# Patient Record
Sex: MALE | Race: WHITE | NOT HISPANIC OR LATINO | Employment: OTHER | URBAN - METROPOLITAN AREA
[De-identification: names, ages, dates, MRNs, and addresses within clinical notes are randomized per-mention and may not be internally consistent; named-entity substitution may affect disease eponyms.]

---

## 2022-04-07 ENCOUNTER — HISTORICAL (OUTPATIENT)
Dept: ADMINISTRATIVE | Facility: HOSPITAL | Age: 37
End: 2022-04-07

## 2022-04-24 VITALS
HEIGHT: 71 IN | OXYGEN SATURATION: 97 % | BODY MASS INDEX: 36.98 KG/M2 | WEIGHT: 264.13 LBS | SYSTOLIC BLOOD PRESSURE: 108 MMHG | DIASTOLIC BLOOD PRESSURE: 70 MMHG

## 2023-02-20 ENCOUNTER — HOSPITAL ENCOUNTER (EMERGENCY)
Facility: HOSPITAL | Age: 38
Discharge: HOME | End: 2023-02-20
Attending: EMERGENCY MEDICINE
Payer: COMMERCIAL

## 2023-02-20 VITALS
WEIGHT: 260 LBS | HEIGHT: 71 IN | RESPIRATION RATE: 16 BRPM | TEMPERATURE: 98 F | SYSTOLIC BLOOD PRESSURE: 116 MMHG | HEART RATE: 81 BPM | BODY MASS INDEX: 36.4 KG/M2 | DIASTOLIC BLOOD PRESSURE: 69 MMHG | OXYGEN SATURATION: 98 %

## 2023-02-20 DIAGNOSIS — S61.411A LACERATION OF RIGHT HAND WITHOUT FOREIGN BODY, INITIAL ENCOUNTER: Primary | ICD-10-CM

## 2023-02-20 PROCEDURE — 90715 TDAP VACCINE 7 YRS/> IM: CPT | Performed by: NURSE PRACTITIONER

## 2023-02-20 PROCEDURE — 99284 EMERGENCY DEPT VISIT MOD MDM: CPT | Mod: 25

## 2023-02-20 PROCEDURE — 90471 IMMUNIZATION ADMIN: CPT | Performed by: NURSE PRACTITIONER

## 2023-02-20 PROCEDURE — 25000003 PHARM REV CODE 250: Performed by: NURSE PRACTITIONER

## 2023-02-20 PROCEDURE — 63600175 PHARM REV CODE 636 W HCPCS: Performed by: NURSE PRACTITIONER

## 2023-02-20 PROCEDURE — 12042 INTMD RPR N-HF/GENIT2.6-7.5: CPT

## 2023-02-20 PROCEDURE — 12002 RPR S/N/AX/GEN/TRNK2.6-7.5CM: CPT

## 2023-02-20 RX ORDER — CEPHALEXIN 500 MG/1
500 CAPSULE ORAL 4 TIMES DAILY
Qty: 28 CAPSULE | Refills: 0 | Status: SHIPPED | OUTPATIENT
Start: 2023-02-20 | End: 2023-02-27

## 2023-02-20 RX ORDER — HYDROCODONE BITARTRATE AND ACETAMINOPHEN 7.5; 325 MG/1; MG/1
1 TABLET ORAL
Status: COMPLETED | OUTPATIENT
Start: 2023-02-20 | End: 2023-02-20

## 2023-02-20 RX ORDER — HYDROCODONE BITARTRATE AND ACETAMINOPHEN 7.5; 325 MG/1; MG/1
1 TABLET ORAL EVERY 6 HOURS PRN
Qty: 16 TABLET | Refills: 0 | Status: SHIPPED | OUTPATIENT
Start: 2023-02-20 | End: 2023-03-22

## 2023-02-20 RX ADMIN — HYDROCODONE BITARTRATE AND ACETAMINOPHEN 1 TABLET: 7.5; 325 TABLET ORAL at 03:02

## 2023-02-20 RX ADMIN — TETANUS TOXOID, REDUCED DIPHTHERIA TOXOID AND ACELLULAR PERTUSSIS VACCINE, ADSORBED 0.5 ML: 5; 2.5; 8; 8; 2.5 SUSPENSION INTRAMUSCULAR at 03:02

## 2023-02-20 NOTE — ED PROVIDER NOTES
Encounter Date: 2/20/2023       History     Chief Complaint   Patient presents with    Laceration     R hand laceration. No active bleeding. Neuro and cms intact      See MDM    The history is provided by the patient. No  was used.   Review of patient's allergies indicates:  No Known Allergies  Past Medical History:   Diagnosis Date    ADHD (attention deficit hyperactivity disorder), inattentive type     GERD (gastroesophageal reflux disease)     Influenza vaccine refused     Obesity, unspecified     Obstructive sleep apnea     Pilonidal cyst with abscess     Tobacco user      Past Surgical History:   Procedure Laterality Date    INCISION AND DRAINAGE, PILONIDAL CYST, SIMPLE  03/07/2020     Family History   Problem Relation Age of Onset    No Known Problems Mother     No Known Problems Father     No Known Problems Sister     No Known Problems Brother         1/2 brother     Social History     Tobacco Use    Smoking status: Every Day     Packs/day: 0.50     Types: Cigarettes    Smokeless tobacco: Never    Tobacco comments:     Age Started: 18   Substance Use Topics    Alcohol use: Yes     Comment: 3-5 times per week    Drug use: Never     Review of Systems   Constitutional:  Negative for fever.   Respiratory:  Negative for cough and shortness of breath.    Cardiovascular:  Negative for chest pain.   Gastrointestinal:  Negative for abdominal pain.   Genitourinary:  Negative for difficulty urinating and dysuria.   Musculoskeletal:  Negative for gait problem.   Skin:  Negative for color change.   Neurological:  Negative for dizziness, speech difficulty and headaches.   Psychiatric/Behavioral:  Negative for hallucinations and suicidal ideas.    All other systems reviewed and are negative.    Physical Exam     Initial Vitals [02/20/23 1504]   BP Pulse Resp Temp SpO2   111/76 93 18 97.9 °F (36.6 °C) 99 %      MAP       --         Physical Exam    Nursing note and vitals reviewed.  Constitutional: He  appears well-developed and well-nourished.   HENT:   Head: Normocephalic.   Eyes: EOM are normal.   Neck: Neck supple.   Normal range of motion.  Cardiovascular:  Normal rate, regular rhythm, normal heart sounds and intact distal pulses.           Pulmonary/Chest: Breath sounds normal.   Abdominal: Abdomen is soft. Bowel sounds are normal.   Musculoskeletal:         General: Normal range of motion.      Cervical back: Normal range of motion and neck supple.     Neurological: He is alert and oriented to person, place, and time. He has normal strength.   Skin: Skin is warm and dry. Capillary refill takes less than 2 seconds.   Laceration right hand    Psychiatric: He has a normal mood and affect. His behavior is normal. Judgment and thought content normal.       ED Course   Lac Repair    Date/Time: 2/20/2023 5:00 PM  Performed by: SAMMY Alvarez  Authorized by: Guru Henning III, MD     Consent:     Consent obtained:  Verbal    Consent given by:  Patient    Risks, benefits, and alternatives were discussed: yes      Risks discussed:  Infection, need for additional repair, nerve damage, retained foreign body, tendon damage, vascular damage, poor wound healing, poor cosmetic result and pain    Alternatives discussed:  No treatment, delayed treatment, observation and referral  Universal protocol:     Procedure explained and questions answered to patient or proxy's satisfaction: yes      Relevant documents present and verified: yes      Test results available: yes      Imaging studies available: yes      Site/side marked: yes      Immediately prior to procedure, a time out was called: yes      Patient identity confirmed:  Verbally with patient, arm band, provided demographic data, hospital-assigned identification number and anonymous protocol, patient vented/unresponsive  Anesthesia:     Anesthesia method:  Local infiltration    Local anesthetic:  Lidocaine 1% w/o epi  Laceration details:     Location:  Hand     Hand location:  R palm    Length (cm):  6  Pre-procedure details:     Preparation:  Patient was prepped and draped in usual sterile fashion and imaging obtained to evaluate for foreign bodies  Exploration:     Limited defect created (wound extended): yes      Imaging obtained: x-ray      Imaging outcome: foreign body not noted      Wound exploration: wound explored through full range of motion and entire depth of wound visualized      Wound extent: no fascia violation noted, no foreign bodies/material noted, no muscle damage noted, no nerve damage noted, no tendon damage noted, no underlying fracture noted and no vascular damage noted      Contaminated: yes    Treatment:     Area cleansed with:  Povidone-iodine    Amount of cleaning:  Standard    Irrigation solution:  Sterile saline    Irrigation method:  Syringe    Visualized foreign bodies/material removed: no      Debridement:  None    Undermining:  None  Skin repair:     Repair method:  Sutures    Suture size:  4-0    Suture technique:  Simple interrupted    Number of sutures:  13  Approximation:     Approximation:  Close  Repair type:     Repair type:  Simple  Post-procedure details:     Procedure completion:  Tolerated well, no immediate complications  Labs Reviewed - No data to display       Imaging Results              X-Ray Hand 3 view Right (Final result)  Result time 02/20/23 15:53:43      Final result by Magalys Jamison MD (02/20/23 15:53:43)                   Impression:      No acute bony abnormality.      Electronically signed by: Magalys Jamison  Date:    02/20/2023  Time:    15:53               Narrative:    EXAMINATION:  XR HAND COMPLETE 3 VIEW RIGHT    CLINICAL HISTORY:  Laceration;    COMPARISON:  None.    FINDINGS:  There is no acute fracture or malalignment.  The soft tissues are unremarkable.                                       Medications   Tdap (BOOSTRIX) vaccine injection 0.5 mL (0.5 mLs Intramuscular Given 2/20/23 3965)    HYDROcodone-acetaminophen 7.5-325 mg per tablet 1 tablet (1 tablet Oral Given 2/20/23 2789)     Medical Decision Making:   Initial Assessment:   Historian:  Patient.  Patient is a 37-year-old male  that presents with hand laceration that has been present today. Associated symptoms nothing. Surrounding information is patient was tightening up a hose clamp and injured his hand on the hose clamp. Exacerbated by nothing. Relieved by nothing. Patient treatment prior to arrival none. Risk factors include none. Other history pertaining to this complaint nothing.   Assessment:  See physical exam.    Differential Diagnosis:   Hand laceration, hand laceration with foreign body, hand laceration with tendon, hand laceration with muscle involvement  ED Management:  History was obtained. Physical exam performed.  Right hand X-ray was negative for foreign bodies.  Sutures were placed without difficulty.  No tendon damage was noted.  Patient has full range of motion.  Patient's  was normal.  Will place patient on Keflex prophylaxis and Norco for pain.  No medical or surgical consult for indicated.  No social determinants that would affect his healthcare were noted.                        Clinical Impression:   Final diagnoses:  [S61.411A] Laceration of right hand without foreign body, initial encounter (Primary)        ED Disposition Condition    Discharge Stable          ED Prescriptions       Medication Sig Dispense Start Date End Date Auth. Provider    HYDROcodone-acetaminophen (NORCO) 7.5-325 mg per tablet Take 1 tablet by mouth every 6 (six) hours as needed for Pain. 16 tablet 2/20/2023 -- SAMMY Alvarez    cephALEXin (KEFLEX) 500 MG capsule Take 1 capsule (500 mg total) by mouth 4 (four) times daily. for 7 days 28 capsule 2/20/2023 2/27/2023 SAMMY Alvarez          Follow-up Information       Follow up With Specialties Details Why Contact Info    Keaton Gibbs, DO Orthopedic Surgery Call in 1 week ed follow  up [address] [facility] [address]  [facility] [address]  [phone]               Javier Dominguez, JUAN AP  02/20/23 8260

## 2023-02-20 NOTE — FIRST PROVIDER EVALUATION
"Medical screening examination initiated.  I have conducted a focused provider triage encounter, findings are as follows:    Brief history of present illness:  Patient states laceration to the palmar surface of his right hand after cutting it with a horse clamp PTA. States last tetanus shot is unknown.     Vitals:    02/20/23 1504   BP: 111/76   Pulse: 93   Resp: 18   Temp: 97.9 °F (36.6 °C)   TempSrc: Temporal   SpO2: 99%   Weight: 117.9 kg (260 lb)   Height: 5' 11" (1.803 m)       Pertinent physical exam:  Awake, alert, ambulatory      Brief workup plan:  x-ray, TDAP, Laceration repair    Preliminary workup initiated; this workup will be continued and followed by the physician or advanced practice provider that is assigned to the patient when roomed.  "

## 2023-03-22 PROBLEM — Z00.00 WELLNESS EXAMINATION: Status: ACTIVE | Noted: 2023-03-22

## 2023-09-25 PROBLEM — Z00.00 WELLNESS EXAMINATION: Status: RESOLVED | Noted: 2023-03-22 | Resolved: 2023-09-25

## 2023-10-05 PROBLEM — E66.01 SEVERE OBESITY (BMI 35.0-39.9) WITH COMORBIDITY: Status: ACTIVE | Noted: 2023-10-05

## 2025-04-28 ENCOUNTER — OFFICE VISIT (OUTPATIENT)
Dept: SURGERY | Facility: CLINIC | Age: 40
End: 2025-04-28
Payer: COMMERCIAL

## 2025-04-28 ENCOUNTER — TELEPHONE (OUTPATIENT)
Dept: SURGERY | Facility: CLINIC | Age: 40
End: 2025-04-28

## 2025-04-28 VITALS
DIASTOLIC BLOOD PRESSURE: 83 MMHG | HEART RATE: 88 BPM | BODY MASS INDEX: 42.14 KG/M2 | WEIGHT: 301 LBS | SYSTOLIC BLOOD PRESSURE: 129 MMHG | HEIGHT: 71 IN

## 2025-04-28 DIAGNOSIS — L05.91 PILONIDAL CYST: Primary | ICD-10-CM

## 2025-04-28 PROCEDURE — 3008F BODY MASS INDEX DOCD: CPT | Mod: CPTII,,, | Performed by: SURGERY

## 2025-04-28 PROCEDURE — 3079F DIAST BP 80-89 MM HG: CPT | Mod: CPTII,,, | Performed by: SURGERY

## 2025-04-28 PROCEDURE — 99214 OFFICE O/P EST MOD 30 MIN: CPT | Mod: ,,, | Performed by: SURGERY

## 2025-04-28 PROCEDURE — 1159F MED LIST DOCD IN RCRD: CPT | Mod: CPTII,,, | Performed by: SURGERY

## 2025-04-28 PROCEDURE — 3074F SYST BP LT 130 MM HG: CPT | Mod: CPTII,,, | Performed by: SURGERY

## 2025-04-28 RX ORDER — HYDROCODONE BITARTRATE AND ACETAMINOPHEN 5; 325 MG/1; MG/1
1 TABLET ORAL EVERY 6 HOURS PRN
Qty: 10 TABLET | Refills: 0 | Status: ON HOLD | OUTPATIENT
Start: 2025-04-28 | End: 2025-04-29 | Stop reason: HOSPADM

## 2025-04-28 RX ORDER — CIPROFLOXACIN 500 MG/1
500 TABLET ORAL EVERY 12 HOURS
Qty: 14 TABLET | Refills: 0 | Status: SHIPPED | OUTPATIENT
Start: 2025-04-28 | End: 2025-05-05

## 2025-04-28 RX ORDER — METRONIDAZOLE 500 MG/1
500 TABLET ORAL EVERY 12 HOURS
Qty: 14 TABLET | Refills: 0 | Status: SHIPPED | OUTPATIENT
Start: 2025-04-28 | End: 2025-05-05

## 2025-04-28 NOTE — PROGRESS NOTES
[facility] - General Surgery Services  [address] [place] [id number]  Phone: [phone]  Fax: [phone]    History and Physical   General Surgery  Dr. Aron Mack   Patient Name: Leandro Burgess  YOB: 1985    Date: 04/28/2025                     SUBJECTIVE:     Chief Complaint/Reason for Admission:   Chief Complaint   Patient presents with    Consult     pilonidal cyst          History of Present Illness:  Mr. Leandro Burgess is a 40 y.o. male referred for surgical evaluation of pilonidal cyst.  He reports an abscess to sacrum. Present 5 year(s), it has continued to increase in size and is now causing some discomfort. Denies trauma. Denies CP / SOB. Denies fever / chills.   Has undergone multiple I and Ds.      Recent treatment with other health care providers: none    Referring provider: No ref. provider found     Patient Care Team:  Arnoldo Talley MD as PCP - General (Family Medicine)  Aron Mack MD as Consulting Physician (General Surgery)     Review of Systems:  12 point ROS negative except as stated in HPI    PAST HISTORY:     Past Medical History:   Diagnosis Date    ADHD (attention deficit hyperactivity disorder), inattentive type     GERD (gastroesophageal reflux disease)     Influenza vaccine refused     Obstructive sleep apnea     Tobacco user      Past Surgical History:   Procedure Laterality Date    INCISION AND DRAINAGE, PILONIDAL CYST, SIMPLE  03/07/2020     Family History   Problem Relation Name Age of Onset    No Known Problems Mother      No Known Problems Father      No Known Problems Sister      No Known Problems Brother          1/2 brother     Social History     Socioeconomic History    Marital status:     Number of children: 2   Occupational History    Occupation:    Tobacco Use    Smoking status: Former     Current packs/day: 0.00     Average packs/day: 0.5 packs/day for 20.2 years (10.1 ttl  "pk-yrs)     Types: Cigarettes     Start date:      Quit date: 2023     Years since quittin.0    Smokeless tobacco: Never    Tobacco comments:     Age Started: 18   Substance and Sexual Activity    Alcohol use: Yes     Comment: 1-2 drinks per day    Drug use: Never       MEDICATIONS & ALLERGIES:     Current Outpatient Medications on File Prior to Visit   Medication Sig    dextroamphetamine-amphetamine 30 mg Tab Take 1 tablet (30 mg total) by mouth 2 (two) times a day. Fill: 3/05/2024     No current facility-administered medications on file prior to visit.     Review of patient's allergies indicates:  No Known Allergies    OBJECTIVE:     Vitals:    25 1513   BP: 129/83   Pulse: 88   Weight: (!) 136.5 kg (301 lb)   Height: 5' 11" (1.803 m)     Body mass index is 41.98 kg/m².    Physical Exam:  General:  Well developed, well nourished, no acute distress  HEENT:  Normocephalic, atraumatic, PERRL, EOMI, clear sclera, ears normal, neck supple, throat clear without erythema or exudates  CVS:  RRR, S1 and S2 normal, no murmurs, rubs, gallops  Resp:  Lungs clear to auscultation, no wheezes, rales, rhonchi, cough  GI:  Abdomen soft, non-tender, non-distended, normoactive bowel sounds, no masses  :  Deferred  MSK:  No muscle atrophy, cyanosis, peripheral edema, full range of motion  Skin:  Abscess to sacrum c/w pilonidal cyst. Size: 3 CM x 2 CM. Sinus pits present.  Neuro:  CNII-XII grossly intact  Psych:  Alert and oriented to person, place, and time    Results:  I have independently reviewed all pertinent lab and radiologic studies relevant to general surgery.    VISIT DIAGNOSES:       ICD-10-CM ICD-9-CM   1. Pilonidal cyst  L05.91 685.1       ASSESSMENT/PLAN:   Mr. Leandro Burgess is a 40 y.o. male with pilonidal cyst with abscess.     Plan:  - Pilonidal Cystectomy, Incision and Drainage Pilonidal Abscess     Performed in office under sterile conditions with local anesthetic, copious amount of purulence " expressed Wound packed w iodoform    Cipro Flagyl Norco Rx sent to pharmacy    Recc: Exc in OR under general anesthetic    - Discussed signs and symptoms of worsening infection and encouraged to contact our office immediately or present to nearest ED.     - Dr. Mack examined patient, discussed recommendations, discussed risks/benefits of procedure, obtained informed consent, and answered all questions.   - Counseling included differential diagnoses, treatment options, risks and benefits, lifestyle changes, prognosis, and medications.    The patient was interactive, and verbalized understanding.    Aron Mack MD

## 2025-04-28 NOTE — H&P (VIEW-ONLY)
[facility] - General Surgery Services  [address] [place] [id number]  Phone: [phone]  Fax: [phone]    History and Physical   General Surgery  Dr. Aron Mack   Patient Name: Leandro Burgess  YOB: 1985    Date: 04/28/2025                     SUBJECTIVE:     Chief Complaint/Reason for Admission:   Chief Complaint   Patient presents with    Consult     pilonidal cyst          History of Present Illness:  Mr. Leandro Burgess is a 40 y.o. male referred for surgical evaluation of pilonidal cyst.  He reports an abscess to sacrum. Present 5 year(s), it has continued to increase in size and is now causing some discomfort. Denies trauma. Denies CP / SOB. Denies fever / chills.   Has undergone multiple I and Ds.      Recent treatment with other health care providers: none    Referring provider: No ref. provider found     Patient Care Team:  Arnoldo Talley MD as PCP - General (Family Medicine)  Aron Mack MD as Consulting Physician (General Surgery)     Review of Systems:  12 point ROS negative except as stated in HPI    PAST HISTORY:     Past Medical History:   Diagnosis Date    ADHD (attention deficit hyperactivity disorder), inattentive type     GERD (gastroesophageal reflux disease)     Influenza vaccine refused     Obstructive sleep apnea     Tobacco user      Past Surgical History:   Procedure Laterality Date    INCISION AND DRAINAGE, PILONIDAL CYST, SIMPLE  03/07/2020     Family History   Problem Relation Name Age of Onset    No Known Problems Mother      No Known Problems Father      No Known Problems Sister      No Known Problems Brother          1/2 brother     Social History     Socioeconomic History    Marital status:     Number of children: 2   Occupational History    Occupation:    Tobacco Use    Smoking status: Former     Current packs/day: 0.00     Average packs/day: 0.5 packs/day for 20.2 years (10.1 ttl  "pk-yrs)     Types: Cigarettes     Start date:      Quit date: 2023     Years since quittin.0    Smokeless tobacco: Never    Tobacco comments:     Age Started: 18   Substance and Sexual Activity    Alcohol use: Yes     Comment: 1-2 drinks per day    Drug use: Never       MEDICATIONS & ALLERGIES:     Current Outpatient Medications on File Prior to Visit   Medication Sig    dextroamphetamine-amphetamine 30 mg Tab Take 1 tablet (30 mg total) by mouth 2 (two) times a day. Fill: 3/05/2024     No current facility-administered medications on file prior to visit.     Review of patient's allergies indicates:  No Known Allergies    OBJECTIVE:     Vitals:    25 1513   BP: 129/83   Pulse: 88   Weight: (!) 136.5 kg (301 lb)   Height: 5' 11" (1.803 m)     Body mass index is 41.98 kg/m².    Physical Exam:  General:  Well developed, well nourished, no acute distress  HEENT:  Normocephalic, atraumatic, PERRL, EOMI, clear sclera, ears normal, neck supple, throat clear without erythema or exudates  CVS:  RRR, S1 and S2 normal, no murmurs, rubs, gallops  Resp:  Lungs clear to auscultation, no wheezes, rales, rhonchi, cough  GI:  Abdomen soft, non-tender, non-distended, normoactive bowel sounds, no masses  :  Deferred  MSK:  No muscle atrophy, cyanosis, peripheral edema, full range of motion  Skin:  Abscess to sacrum c/w pilonidal cyst. Size: 3 CM x 2 CM. Sinus pits present.  Neuro:  CNII-XII grossly intact  Psych:  Alert and oriented to person, place, and time    Results:  I have independently reviewed all pertinent lab and radiologic studies relevant to general surgery.    VISIT DIAGNOSES:       ICD-10-CM ICD-9-CM   1. Pilonidal cyst  L05.91 685.1       ASSESSMENT/PLAN:   Mr. Leandro Burgess is a 40 y.o. male with pilonidal cyst with abscess.     Plan:  - Pilonidal Cystectomy, Incision and Drainage Pilonidal Abscess     Performed in office under sterile conditions with local anesthetic, copious amount of purulence " expressed Wound packed w iodoform    Cipro Flagyl Norco Rx sent to pharmacy    Recc: Exc in OR under general anesthetic    - Discussed signs and symptoms of worsening infection and encouraged to contact our office immediately or present to nearest ED.     - Dr. Mack examined patient, discussed recommendations, discussed risks/benefits of procedure, obtained informed consent, and answered all questions.   - Counseling included differential diagnoses, treatment options, risks and benefits, lifestyle changes, prognosis, and medications.    The patient was interactive, and verbalized understanding.    Aron Mack MD

## 2025-04-29 ENCOUNTER — ANESTHESIA (OUTPATIENT)
Dept: SURGERY | Facility: HOSPITAL | Age: 40
End: 2025-04-29
Payer: COMMERCIAL

## 2025-04-29 ENCOUNTER — ANESTHESIA EVENT (OUTPATIENT)
Dept: SURGERY | Facility: HOSPITAL | Age: 40
End: 2025-04-29
Payer: COMMERCIAL

## 2025-04-29 ENCOUNTER — HOSPITAL ENCOUNTER (OUTPATIENT)
Facility: HOSPITAL | Age: 40
Discharge: HOME | End: 2025-04-30
Attending: SURGERY | Admitting: SURGERY
Payer: COMMERCIAL

## 2025-04-29 DIAGNOSIS — Z28.21 INFLUENZA VACCINE REFUSED: ICD-10-CM

## 2025-04-29 DIAGNOSIS — F90.0 ADHD (ATTENTION DEFICIT HYPERACTIVITY DISORDER), INATTENTIVE TYPE: ICD-10-CM

## 2025-04-29 DIAGNOSIS — E66.01 SEVERE OBESITY (BMI 35.0-39.9) WITH COMORBIDITY: ICD-10-CM

## 2025-04-29 DIAGNOSIS — Z72.0 TOBACCO USER: ICD-10-CM

## 2025-04-29 DIAGNOSIS — G47.33 OBSTRUCTIVE SLEEP APNEA: ICD-10-CM

## 2025-04-29 DIAGNOSIS — L05.01 PILONIDAL CYST WITH ABSCESS: Primary | ICD-10-CM

## 2025-04-29 DIAGNOSIS — L05.91 PILONIDAL CYST: ICD-10-CM

## 2025-04-29 PROCEDURE — D9220A PRA ANESTHESIA: Mod: ANES,,, | Performed by: ANESTHESIOLOGY

## 2025-04-29 PROCEDURE — 11770 EXC PILONIDAL CYST SIMPLE: CPT | Mod: ,,, | Performed by: SURGERY

## 2025-04-29 PROCEDURE — 63600175 PHARM REV CODE 636 W HCPCS: Performed by: NURSE ANESTHETIST, CERTIFIED REGISTERED

## 2025-04-29 PROCEDURE — 25000003 PHARM REV CODE 250: Performed by: SURGERY

## 2025-04-29 PROCEDURE — 37000009 HC ANESTHESIA EA ADD 15 MINS: Performed by: SURGERY

## 2025-04-29 PROCEDURE — 71000015 HC POSTOP RECOV 1ST HR: Performed by: SURGERY

## 2025-04-29 PROCEDURE — 63600175 PHARM REV CODE 636 W HCPCS: Performed by: ANESTHESIOLOGY

## 2025-04-29 PROCEDURE — 36000707: Performed by: SURGERY

## 2025-04-29 PROCEDURE — 71000016 HC POSTOP RECOV ADDL HR: Performed by: SURGERY

## 2025-04-29 PROCEDURE — 37000008 HC ANESTHESIA 1ST 15 MINUTES: Performed by: SURGERY

## 2025-04-29 PROCEDURE — 36000706: Performed by: SURGERY

## 2025-04-29 PROCEDURE — 71000033 HC RECOVERY, INTIAL HOUR: Performed by: SURGERY

## 2025-04-29 PROCEDURE — 27201423 OPTIME MED/SURG SUP & DEVICES STERILE SUPPLY: Performed by: SURGERY

## 2025-04-29 PROCEDURE — 63600175 PHARM REV CODE 636 W HCPCS: Performed by: NURSE PRACTITIONER

## 2025-04-29 PROCEDURE — 25000003 PHARM REV CODE 250: Performed by: NURSE PRACTITIONER

## 2025-04-29 PROCEDURE — D9220A PRA ANESTHESIA: Mod: CRNA,,, | Performed by: NURSE ANESTHETIST, CERTIFIED REGISTERED

## 2025-04-29 PROCEDURE — 25000003 PHARM REV CODE 250: Performed by: NURSE ANESTHETIST, CERTIFIED REGISTERED

## 2025-04-29 RX ORDER — DEXAMETHASONE SODIUM PHOSPHATE 4 MG/ML
INJECTION, SOLUTION INTRA-ARTICULAR; INTRALESIONAL; INTRAMUSCULAR; INTRAVENOUS; SOFT TISSUE
Status: DISCONTINUED | OUTPATIENT
Start: 2025-04-29 | End: 2025-04-29

## 2025-04-29 RX ORDER — BUPIVACAINE HYDROCHLORIDE AND EPINEPHRINE 2.5; 5 MG/ML; UG/ML
INJECTION, SOLUTION EPIDURAL; INFILTRATION; INTRACAUDAL; PERINEURAL
Status: DISPENSED
Start: 2025-04-29 | End: 2025-04-30

## 2025-04-29 RX ORDER — PROPOFOL 10 MG/ML
VIAL (ML) INTRAVENOUS
Status: DISCONTINUED | OUTPATIENT
Start: 2025-04-29 | End: 2025-04-29

## 2025-04-29 RX ORDER — ENOXAPARIN SODIUM 100 MG/ML
40 INJECTION SUBCUTANEOUS EVERY 24 HOURS
Status: DISCONTINUED | OUTPATIENT
Start: 2025-04-29 | End: 2025-04-30 | Stop reason: HOSPADM

## 2025-04-29 RX ORDER — CELECOXIB 200 MG/1
200 CAPSULE ORAL
OUTPATIENT
Start: 2025-04-29 | End: 2025-04-29

## 2025-04-29 RX ORDER — METHOCARBAMOL 100 MG/ML
1000 INJECTION, SOLUTION INTRAMUSCULAR; INTRAVENOUS ONCE
OUTPATIENT
Start: 2025-04-29 | End: 2025-05-02

## 2025-04-29 RX ORDER — ONDANSETRON 4 MG/1
4 TABLET, ORALLY DISINTEGRATING ORAL
Status: COMPLETED | OUTPATIENT
Start: 2025-04-29 | End: 2025-04-29

## 2025-04-29 RX ORDER — CELECOXIB 200 MG/1
200 CAPSULE ORAL
Status: COMPLETED | OUTPATIENT
Start: 2025-04-29 | End: 2025-04-29

## 2025-04-29 RX ORDER — HYDROCODONE BITARTRATE AND ACETAMINOPHEN 7.5; 325 MG/1; MG/1
1 TABLET ORAL EVERY 6 HOURS PRN
Refills: 0 | OUTPATIENT
Start: 2025-04-29

## 2025-04-29 RX ORDER — LABETALOL HYDROCHLORIDE 5 MG/ML
10 INJECTION, SOLUTION INTRAVENOUS
Status: DISCONTINUED | OUTPATIENT
Start: 2025-04-29 | End: 2025-04-30 | Stop reason: HOSPADM

## 2025-04-29 RX ORDER — ONDANSETRON HYDROCHLORIDE 2 MG/ML
4 INJECTION, SOLUTION INTRAVENOUS EVERY 6 HOURS PRN
OUTPATIENT
Start: 2025-04-29

## 2025-04-29 RX ORDER — METHOCARBAMOL 100 MG/ML
1000 INJECTION, SOLUTION INTRAMUSCULAR; INTRAVENOUS ONCE AS NEEDED
Status: COMPLETED | OUTPATIENT
Start: 2025-04-29 | End: 2025-04-29

## 2025-04-29 RX ORDER — CEFAZOLIN SODIUM 1 G/3ML
1 INJECTION, POWDER, FOR SOLUTION INTRAMUSCULAR; INTRAVENOUS
Status: DISCONTINUED | OUTPATIENT
Start: 2025-04-29 | End: 2025-04-29 | Stop reason: HOSPADM

## 2025-04-29 RX ORDER — PANTOPRAZOLE SODIUM 40 MG/1
40 TABLET, DELAYED RELEASE ORAL DAILY
Status: DISCONTINUED | OUTPATIENT
Start: 2025-04-30 | End: 2025-04-30 | Stop reason: HOSPADM

## 2025-04-29 RX ORDER — SODIUM CHLORIDE, SODIUM LACTATE, POTASSIUM CHLORIDE, CALCIUM CHLORIDE 600; 310; 30; 20 MG/100ML; MG/100ML; MG/100ML; MG/100ML
INJECTION, SOLUTION INTRAVENOUS CONTINUOUS
OUTPATIENT
Start: 2025-04-29

## 2025-04-29 RX ORDER — CLONIDINE 0.1 MG/24H
1 PATCH, EXTENDED RELEASE TRANSDERMAL ONCE AS NEEDED
Status: DISCONTINUED | OUTPATIENT
Start: 2025-04-29 | End: 2025-04-30 | Stop reason: HOSPADM

## 2025-04-29 RX ORDER — MORPHINE SULFATE 4 MG/ML
3 INJECTION, SOLUTION INTRAMUSCULAR; INTRAVENOUS
OUTPATIENT
Start: 2025-04-29

## 2025-04-29 RX ORDER — SODIUM CHLORIDE, SODIUM LACTATE, POTASSIUM CHLORIDE, CALCIUM CHLORIDE 600; 310; 30; 20 MG/100ML; MG/100ML; MG/100ML; MG/100ML
INJECTION, SOLUTION INTRAVENOUS CONTINUOUS
Status: DISCONTINUED | OUTPATIENT
Start: 2025-04-29 | End: 2025-04-29

## 2025-04-29 RX ORDER — GLUCAGON 1 MG
1 KIT INJECTION
Status: DISCONTINUED | OUTPATIENT
Start: 2025-04-29 | End: 2025-04-29 | Stop reason: HOSPADM

## 2025-04-29 RX ORDER — HYDROMORPHONE HYDROCHLORIDE 2 MG/ML
0.4 INJECTION, SOLUTION INTRAMUSCULAR; INTRAVENOUS; SUBCUTANEOUS EVERY 5 MIN PRN
Status: DISCONTINUED | OUTPATIENT
Start: 2025-04-29 | End: 2025-04-29 | Stop reason: HOSPADM

## 2025-04-29 RX ORDER — TRAMADOL HYDROCHLORIDE 50 MG/1
50 TABLET ORAL EVERY 4 HOURS PRN
OUTPATIENT
Start: 2025-04-29

## 2025-04-29 RX ORDER — PROCHLORPERAZINE EDISYLATE 5 MG/ML
5 INJECTION INTRAMUSCULAR; INTRAVENOUS EVERY 6 HOURS PRN
OUTPATIENT
Start: 2025-04-29

## 2025-04-29 RX ORDER — HYDROCODONE BITARTRATE AND ACETAMINOPHEN 7.5; 325 MG/1; MG/1
1 TABLET ORAL EVERY 6 HOURS PRN
Refills: 0 | Status: DISCONTINUED | OUTPATIENT
Start: 2025-04-29 | End: 2025-04-30

## 2025-04-29 RX ORDER — ACETAMINOPHEN 500 MG
1000 TABLET ORAL
Status: COMPLETED | OUTPATIENT
Start: 2025-04-29 | End: 2025-04-29

## 2025-04-29 RX ORDER — MIDAZOLAM HYDROCHLORIDE 2 MG/2ML
2 INJECTION, SOLUTION INTRAMUSCULAR; INTRAVENOUS
Status: DISCONTINUED | OUTPATIENT
Start: 2025-04-29 | End: 2025-04-29 | Stop reason: HOSPADM

## 2025-04-29 RX ORDER — FENTANYL CITRATE 50 UG/ML
INJECTION, SOLUTION INTRAMUSCULAR; INTRAVENOUS
Status: DISCONTINUED | OUTPATIENT
Start: 2025-04-29 | End: 2025-04-29

## 2025-04-29 RX ORDER — BUPIVACAINE HYDROCHLORIDE AND EPINEPHRINE 2.5; 5 MG/ML; UG/ML
INJECTION, SOLUTION EPIDURAL; INFILTRATION; INTRACAUDAL; PERINEURAL
Status: DISCONTINUED | OUTPATIENT
Start: 2025-04-29 | End: 2025-04-29 | Stop reason: HOSPADM

## 2025-04-29 RX ORDER — HYDROCODONE BITARTRATE AND ACETAMINOPHEN 7.5; 325 MG/1; MG/1
1 TABLET ORAL EVERY 6 HOURS PRN
Qty: 16 TABLET | Refills: 0 | Status: SHIPPED | OUTPATIENT
Start: 2025-04-29

## 2025-04-29 RX ORDER — ACETAMINOPHEN 500 MG
1000 TABLET ORAL
OUTPATIENT
Start: 2025-04-29 | End: 2025-04-29

## 2025-04-29 RX ORDER — LIDOCAINE HYDROCHLORIDE 10 MG/ML
1 INJECTION, SOLUTION EPIDURAL; INFILTRATION; INTRACAUDAL; PERINEURAL ONCE
OUTPATIENT
Start: 2025-04-29 | End: 2025-04-29

## 2025-04-29 RX ORDER — KETOROLAC TROMETHAMINE 30 MG/ML
15 INJECTION, SOLUTION INTRAMUSCULAR; INTRAVENOUS EVERY 6 HOURS PRN
Status: DISCONTINUED | OUTPATIENT
Start: 2025-04-29 | End: 2025-04-29 | Stop reason: HOSPADM

## 2025-04-29 RX ORDER — LIDOCAINE HYDROCHLORIDE 10 MG/ML
INJECTION, SOLUTION EPIDURAL; INFILTRATION; INTRACAUDAL; PERINEURAL
Status: DISCONTINUED | OUTPATIENT
Start: 2025-04-29 | End: 2025-04-29

## 2025-04-29 RX ORDER — SODIUM CHLORIDE, SODIUM LACTATE, POTASSIUM CHLORIDE, CALCIUM CHLORIDE 600; 310; 30; 20 MG/100ML; MG/100ML; MG/100ML; MG/100ML
INJECTION, SOLUTION INTRAVENOUS CONTINUOUS
Status: DISCONTINUED | OUTPATIENT
Start: 2025-04-29 | End: 2025-04-30 | Stop reason: HOSPADM

## 2025-04-29 RX ORDER — ONDANSETRON 4 MG/1
4 TABLET, ORALLY DISINTEGRATING ORAL ONCE
OUTPATIENT
Start: 2025-04-29 | End: 2025-04-29

## 2025-04-29 RX ORDER — MORPHINE SULFATE 4 MG/ML
2 INJECTION, SOLUTION INTRAMUSCULAR; INTRAVENOUS
Refills: 0 | Status: DISCONTINUED | OUTPATIENT
Start: 2025-04-29 | End: 2025-04-30 | Stop reason: HOSPADM

## 2025-04-29 RX ORDER — ACETAMINOPHEN 10 MG/ML
1000 INJECTION, SOLUTION INTRAVENOUS EVERY 8 HOURS
Status: DISCONTINUED | OUTPATIENT
Start: 2025-04-29 | End: 2025-04-30 | Stop reason: HOSPADM

## 2025-04-29 RX ORDER — PROCHLORPERAZINE EDISYLATE 5 MG/ML
5 INJECTION INTRAMUSCULAR; INTRAVENOUS EVERY 30 MIN PRN
Status: DISCONTINUED | OUTPATIENT
Start: 2025-04-29 | End: 2025-04-29

## 2025-04-29 RX ORDER — GABAPENTIN 300 MG/1
300 CAPSULE ORAL
Status: COMPLETED | OUTPATIENT
Start: 2025-04-29 | End: 2025-04-29

## 2025-04-29 RX ORDER — ONDANSETRON HYDROCHLORIDE 2 MG/ML
4 INJECTION, SOLUTION INTRAVENOUS EVERY 6 HOURS PRN
Status: DISCONTINUED | OUTPATIENT
Start: 2025-04-29 | End: 2025-04-30 | Stop reason: HOSPADM

## 2025-04-29 RX ORDER — MIDAZOLAM HYDROCHLORIDE 2 MG/2ML
2 INJECTION, SOLUTION INTRAMUSCULAR; INTRAVENOUS ONCE AS NEEDED
OUTPATIENT
Start: 2025-04-29 | End: 2036-09-25

## 2025-04-29 RX ORDER — GABAPENTIN 300 MG/1
300 CAPSULE ORAL
OUTPATIENT
Start: 2025-04-29 | End: 2025-04-29

## 2025-04-29 RX ORDER — ONDANSETRON HYDROCHLORIDE 2 MG/ML
INJECTION, SOLUTION INTRAMUSCULAR; INTRAVENOUS
Status: DISCONTINUED | OUTPATIENT
Start: 2025-04-29 | End: 2025-04-29

## 2025-04-29 RX ORDER — ROCURONIUM BROMIDE 10 MG/ML
INJECTION, SOLUTION INTRAVENOUS
Status: DISCONTINUED | OUTPATIENT
Start: 2025-04-29 | End: 2025-04-29

## 2025-04-29 RX ADMIN — SUGAMMADEX 300 MG: 100 INJECTION, SOLUTION INTRAVENOUS at 05:04

## 2025-04-29 RX ADMIN — SODIUM CHLORIDE, POTASSIUM CHLORIDE, SODIUM LACTATE AND CALCIUM CHLORIDE: 600; 310; 30; 20 INJECTION, SOLUTION INTRAVENOUS at 05:04

## 2025-04-29 RX ADMIN — ROCURONIUM BROMIDE 50 MG: 10 INJECTION, SOLUTION INTRAVENOUS at 04:04

## 2025-04-29 RX ADMIN — HYDROMORPHONE HYDROCHLORIDE 0.4 MG: 2 INJECTION INTRAMUSCULAR; INTRAVENOUS; SUBCUTANEOUS at 06:04

## 2025-04-29 RX ADMIN — FENTANYL CITRATE 100 MCG: 50 INJECTION, SOLUTION INTRAMUSCULAR; INTRAVENOUS at 04:04

## 2025-04-29 RX ADMIN — CELECOXIB 200 MG: 200 CAPSULE ORAL at 01:04

## 2025-04-29 RX ADMIN — METHOCARBAMOL 1000 MG: 100 INJECTION INTRAMUSCULAR; INTRAVENOUS at 05:04

## 2025-04-29 RX ADMIN — HYDROCODONE BITARTRATE AND ACETAMINOPHEN 1 TABLET: 7.5; 325 TABLET ORAL at 11:04

## 2025-04-29 RX ADMIN — DEXAMETHASONE SODIUM PHOSPHATE 4 MG: 4 INJECTION, SOLUTION INTRA-ARTICULAR; INTRALESIONAL; INTRAMUSCULAR; INTRAVENOUS; SOFT TISSUE at 05:04

## 2025-04-29 RX ADMIN — LIDOCAINE HYDROCHLORIDE 50 MG: 10 INJECTION, SOLUTION EPIDURAL; INFILTRATION; INTRACAUDAL; PERINEURAL at 04:04

## 2025-04-29 RX ADMIN — GABAPENTIN 300 MG: 300 CAPSULE ORAL at 01:04

## 2025-04-29 RX ADMIN — ONDANSETRON 4 MG: 2 INJECTION INTRAMUSCULAR; INTRAVENOUS at 05:04

## 2025-04-29 RX ADMIN — MIDAZOLAM HYDROCHLORIDE 2 MG: 1 INJECTION, SOLUTION INTRAMUSCULAR; INTRAVENOUS at 04:04

## 2025-04-29 RX ADMIN — CEFAZOLIN 1 G: 330 INJECTION, POWDER, FOR SOLUTION INTRAMUSCULAR; INTRAVENOUS at 04:04

## 2025-04-29 RX ADMIN — ACETAMINOPHEN 1000 MG: 500 TABLET ORAL at 01:04

## 2025-04-29 RX ADMIN — SODIUM CHLORIDE, POTASSIUM CHLORIDE, SODIUM LACTATE AND CALCIUM CHLORIDE: 600; 310; 30; 20 INJECTION, SOLUTION INTRAVENOUS at 04:04

## 2025-04-29 RX ADMIN — ONDANSETRON 4 MG: 4 TABLET, ORALLY DISINTEGRATING ORAL at 01:04

## 2025-04-29 RX ADMIN — PROPOFOL 200 MG: 10 INJECTION, EMULSION INTRAVENOUS at 04:04

## 2025-04-29 NOTE — TRANSFER OF CARE
Anesthesia Transfer of Care Note    Patient: Leandro Burgess    Procedure(s) Performed: Procedure(s) (LRB):  EXCISION, PILONIDAL CYST (N/A)    Patient location: PACU    Anesthesia Type: general    Transport from OR: Transported from OR on room air with adequate spontaneous ventilation    Post pain: adequate analgesia    Post assessment: no apparent anesthetic complications    Post vital signs: stable    Level of consciousness: sedated    Nausea/Vomiting: no nausea/vomiting    Complications: none    Transfer of care protocol was followed      Last vitals: Visit Vitals  /70   Pulse 80   Temp 36.8 °C (98.2 °F)   Resp 20   Wt (!) 136.7 kg (301 lb 5.9 oz)   SpO2 97%   BMI 42.03 kg/m²

## 2025-04-29 NOTE — OP NOTE
[facility] Surgical - Periop Services  Surgery Department  Operative Note    SUMMARY     Date of Procedure: 4/29/2025     Procedure: Procedure(s) (LRB):  EXCISION, PILONIDAL CYST (N/A)     Surgeons and Role:     * Aron Mack MD - Primary    Assisting Surgeon: None    Pre-Operative Diagnosis: Pilonidal cyst [L05.91]    Post-Operative Diagnosis: Post-Op Diagnosis Codes:     * Pilonidal cyst [L05.91]    Anesthesia: General    Operative Findings (including complications, if any): pilonidal cyst w abscess    Description of Technical Procedures: Taken to the OR.  Placed upon the table in supine position.  Appropriate time out performed.  Gen Anesthesia administered.  Placed in prone jacknife position with appropriate padding of pressure points.  The buttocks taped laterally.  An elipse was performed around the pilonidal cyst and carried down to the sacral fascia.  This was completely excised.  Hemostasis assured.  Wound packed open.           Estimated Blood Loss (EBL): * No values recorded between 4/29/2025  4:47 PM and 4/29/2025  5:54 PM *                Specimens:   Specimen (24h ago, onward)       Start     Ordered    04/29/25 1723  Specimen to Pathology General Surgery  RELEASE UPON ORDERING        References:    Click here for ordering Quick Tip   Question:  Release to patient  Answer:  Immediate    04/29/25 1723                            Condition: Good    Disposition: PACU - hemodynamically stable.      DISCHARGE NOTE    DISCHARGE DATE:   4/29/2025    PRINCIPAL DIAGNOSIS:  Pilonidal cyst with abscess    OUTCOME:  Satisfactory    DISPOSITION:  Observation    FOLLOWUP:  In general surgery clinic    Aron Mack MD  General Surgery  [facility] Surgical - Peri Services

## 2025-04-29 NOTE — ANESTHESIA PREPROCEDURE EVALUATION
04/29/2025  Leandro Burgess is a 40 y.o., male, who presents for the following:    Procedure: EXCISION, PILONIDAL CYST (Coccyx)   Anesthesia type: General   Diagnosis: Pilonidal cyst [L05.91]   Pre-op diagnosis: Pilonidal cyst [L05.91]   Location: [facility] OR 01 / [facility] OR   Surgeons: Aron Mack MD     Past Medical History:   Diagnosis Date    ADHD (attention deficit hyperactivity disorder), inattentive type     GERD (gastroesophageal reflux disease)     Influenza vaccine refused     Obstructive sleep apnea     Tobacco user      LAB :  reviewed / acceptable    Pre-op Assessment    I have reviewed the Patient Summary Reports.     I have reviewed the Nursing Notes. I have reviewed the NPO Status.   I have reviewed the Medications.     Review of Systems  Anesthesia Hx:  No previous Anesthesia             Denies Family Hx of Anesthesia complications.    Denies Personal Hx of Anesthesia complications.                    Social:  Non-Smoker       Cardiovascular:  Cardiovascular Normal                                              Pulmonary:        Sleep Apnea, CPAP                Hepatic/GI:     GERD                Endocrine:        Morbid Obesity / BMI > 40  Psych:     ADHD               Physical Exam  General: Alert and Oriented  Morbid Obesity  Airway:  Mallampati: II   Mouth Opening: Small, but > 3cm  TM Distance: 4 - 6 cm  Tongue: Normal  Neck ROM: Normal ROM  Neck: Girth Increased    Dental:  Intact    Chest/Lungs:  Normal Respiratory Rate    Heart:  Rate: Normal  Rhythm: Regular Rhythm        Anesthesia Plan  Type of Anesthesia, risks & benefits discussed:    Anesthesia Type: Gen ETT  Intra-op Monitoring Plan: Standard ASA Monitors  Post Op Pain Control Plan: multimodal analgesia and IV/PO Opioids PRN  Induction:  IV  Airway Plan: Direct, Post-Induction  Informed Consent: Informed consent signed with  the Patient and all parties understand the risks and agree with anesthesia plan.  All questions answered.   ASA Score: 2  Day of Surgery Review of History & Physical: H&P Update referred to the surgeon/provider.  Anesthesia Plan Notes: Premedication: Midazolam  Special Technique: Preemptive analgesia with Neurontin, zofran, acetaminophen and celebrex    PONV prophylaxis with intraop zofran, decadron     Ready For Surgery From Anesthesia Perspective.     .

## 2025-04-29 NOTE — INTERVAL H&P NOTE
The patient has been examined and the H&P has been reviewed:    I concur with the findings and no changes have occurred since H&P was written.    Procedure risks, benefits and alternative options discussed and understood by patient/family.    Incision and drainage pilonidal cyst with abscess, pilonidal cystectomy    Dr. Mack examined patient, discussed recommendations, obtained informed consent, and answered all questions.       I, WILBERTO Putnam, am scribing for, and in the presence of, Aron Mack MD, performed the above scribed service and the documentation accurately describes the services I performed. I attest to the accuracy of the note.      Active Hospital Problems    Diagnosis  POA    *Pilonidal cyst with abscess [L05.01]  Yes      Resolved Hospital Problems   No resolved problems to display.

## 2025-04-29 NOTE — ANESTHESIA PROCEDURE NOTES
Intubation    Date/Time: 4/29/2025 5:02 PM    Performed by: Meenu Hanley CRNA  Authorized by: Mino Hendricks MD    Intubation:     Induction:  Intravenous    Intubated:  Postinduction    Mask Ventilation:  Easy mask    Attempts:  1    Attempted By:  CRNA    Blade:  Poon 2    Laryngeal View Grade: Grade I - full view of cords      Difficult Airway Encountered?: No      Complications:  None    Airway Device:  Oral endotracheal tube    Airway Device Size:  7.5    Style/Cuff Inflation:  Cuffed (inflated to minimal occlusive pressure)    Tube secured:  23    Secured at:  The lips    Placement Verified By:  Capnometry    Complicating Factors:  None    Findings Post-Intubation:  BS equal bilateral and atraumatic/condition of teeth unchanged

## 2025-04-29 NOTE — CARE UPDATE
Received patient from the OR, he is arousing upon pacu arrival, chin, oriented/reassured him, c/o pain-will medicate per anesthesia orders, respirations full-regular-deep-clear,hob up 30 degrees.

## 2025-04-30 ENCOUNTER — TELEPHONE (OUTPATIENT)
Dept: SURGERY | Facility: CLINIC | Age: 40
End: 2025-04-30
Payer: COMMERCIAL

## 2025-04-30 LAB
ANION GAP SERPL CALC-SCNC: 10 MEQ/L
BASOPHILS # BLD AUTO: 0.01 X10(3)/MCL
BASOPHILS NFR BLD AUTO: 0.1 %
BUN SERPL-MCNC: 13.7 MG/DL (ref 8.9–20.6)
CALCIUM SERPL-MCNC: 8.6 MG/DL (ref 8.4–10.2)
CHLORIDE SERPL-SCNC: 108 MMOL/L (ref 98–107)
CO2 SERPL-SCNC: 21 MMOL/L (ref 22–29)
CREAT SERPL-MCNC: 0.84 MG/DL (ref 0.72–1.25)
CREAT/UREA NIT SERPL: 16
EOSINOPHIL # BLD AUTO: 0 X10(3)/MCL (ref 0–0.9)
EOSINOPHIL NFR BLD AUTO: 0 %
ERYTHROCYTE [DISTWIDTH] IN BLOOD BY AUTOMATED COUNT: 12.9 % (ref 11.5–17)
GFR SERPLBLD CREATININE-BSD FMLA CKD-EPI: >60 ML/MIN/1.73/M2
GLUCOSE SERPL-MCNC: 125 MG/DL (ref 74–100)
HCT VFR BLD AUTO: 42.2 % (ref 42–52)
HGB BLD-MCNC: 13.3 G/DL (ref 14–18)
IMM GRANULOCYTES # BLD AUTO: 0.05 X10(3)/MCL (ref 0–0.04)
IMM GRANULOCYTES NFR BLD AUTO: 0.5 %
LYMPHOCYTES # BLD AUTO: 0.57 X10(3)/MCL (ref 0.6–4.6)
LYMPHOCYTES NFR BLD AUTO: 5.7 %
MCH RBC QN AUTO: 31.6 PG (ref 27–31)
MCHC RBC AUTO-ENTMCNC: 31.5 G/DL (ref 33–36)
MCV RBC AUTO: 100.2 FL (ref 80–94)
MONOCYTES # BLD AUTO: 0.46 X10(3)/MCL (ref 0.1–1.3)
MONOCYTES NFR BLD AUTO: 4.6 %
NEUTROPHILS # BLD AUTO: 8.85 X10(3)/MCL (ref 2.1–9.2)
NEUTROPHILS NFR BLD AUTO: 89.1 %
NRBC BLD AUTO-RTO: 0 %
PLATELET # BLD AUTO: 245 X10(3)/MCL (ref 130–400)
PMV BLD AUTO: 9.8 FL (ref 7.4–10.4)
POTASSIUM SERPL-SCNC: 4.9 MMOL/L (ref 3.5–5.1)
RBC # BLD AUTO: 4.21 X10(6)/MCL (ref 4.7–6.1)
SODIUM SERPL-SCNC: 139 MMOL/L (ref 136–145)
WBC # BLD AUTO: 9.94 X10(3)/MCL (ref 4.5–11.5)

## 2025-04-30 PROCEDURE — 63600175 PHARM REV CODE 636 W HCPCS: Performed by: SURGERY

## 2025-04-30 PROCEDURE — G0378 HOSPITAL OBSERVATION PER HR: HCPCS

## 2025-04-30 PROCEDURE — 25000003 PHARM REV CODE 250: Performed by: SURGERY

## 2025-04-30 PROCEDURE — 85025 COMPLETE CBC W/AUTO DIFF WBC: CPT | Performed by: SURGERY

## 2025-04-30 PROCEDURE — 80048 BASIC METABOLIC PNL TOTAL CA: CPT | Performed by: SURGERY

## 2025-04-30 RX ORDER — HYDROCODONE BITARTRATE AND ACETAMINOPHEN 7.5; 325 MG/1; MG/1
1 TABLET ORAL EVERY 4 HOURS PRN
Refills: 0 | Status: DISCONTINUED | OUTPATIENT
Start: 2025-04-30 | End: 2025-04-30 | Stop reason: HOSPADM

## 2025-04-30 RX ADMIN — PANTOPRAZOLE SODIUM 40 MG: 40 TABLET, DELAYED RELEASE ORAL at 09:04

## 2025-04-30 RX ADMIN — MORPHINE SULFATE 2 MG: 4 INJECTION, SOLUTION INTRAMUSCULAR; INTRAVENOUS at 02:04

## 2025-04-30 RX ADMIN — ACETAMINOPHEN 1000 MG: 10 INJECTION, SOLUTION INTRAVENOUS at 06:04

## 2025-04-30 RX ADMIN — HYDROCODONE BITARTRATE AND ACETAMINOPHEN 1 TABLET: 7.5; 325 TABLET ORAL at 04:04

## 2025-04-30 RX ADMIN — MORPHINE SULFATE 1 MG: 4 INJECTION, SOLUTION INTRAMUSCULAR; INTRAVENOUS at 01:04

## 2025-04-30 RX ADMIN — HYDROCODONE BITARTRATE AND ACETAMINOPHEN 1 TABLET: 7.5; 325 TABLET ORAL at 11:04

## 2025-04-30 NOTE — TELEPHONE ENCOUNTER
Will start working on this and do my best to get one for Monday. I will also arrange for home health.    Jacki I am assuming he will want it changed 3 x's/week?

## 2025-04-30 NOTE — TELEPHONE ENCOUNTER
----- Message from WILBERTO Putnam sent at 4/30/2025  3:05 PM CDT -----  Regarding: Wound Vac  Patient had pilonidal cystectomy in OR yesterday at [facility]. Patient is currently doing moist to dry sterile gauze dressing changes BID. Dr. Mack would like to order a wound vac for this patient and place it himself in clinic on Monday 5/5/25 if possible. Wound current measurements are 10 cm x 6 cm x 5 cm.He would like 2 black sponges sent with the pack. Please arrange and have wound vac ordered.

## 2025-05-01 ENCOUNTER — PATIENT OUTREACH (OUTPATIENT)
Dept: ADMINISTRATIVE | Facility: CLINIC | Age: 40
End: 2025-05-01
Payer: COMMERCIAL

## 2025-05-01 VITALS
BODY MASS INDEX: 42.03 KG/M2 | HEART RATE: 69 BPM | SYSTOLIC BLOOD PRESSURE: 119 MMHG | RESPIRATION RATE: 18 BRPM | OXYGEN SATURATION: 95 % | DIASTOLIC BLOOD PRESSURE: 74 MMHG | WEIGHT: 301.38 LBS | TEMPERATURE: 98 F

## 2025-05-01 NOTE — PROGRESS NOTES
C3 nurse spoke with Leandro Khan Aditya wife Taisha for a TCC post hospital discharge follow up call. The patient has a scheduled HOSFU appointment with Aron Mack MD 5/5/25 @3:30pm.

## 2025-05-01 NOTE — TELEPHONE ENCOUNTER
Sent Referral To [facility] for wound vac changes.     Faxed the [employer] form to [facility] to be sure they received it.     Called pt and he is aware of above. We did schedule his appt for Monday also

## 2025-05-01 NOTE — TELEPHONE ENCOUNTER
Yes typically wound vac changes three times per week. Continue moist to dry gauze dressing changes until wound vac approved and arrives.

## 2025-05-02 LAB — PSYCHE PATHOLOGY RESULT: NORMAL

## 2025-05-05 ENCOUNTER — OFFICE VISIT (OUTPATIENT)
Dept: SURGERY | Facility: CLINIC | Age: 40
End: 2025-05-05
Payer: COMMERCIAL

## 2025-05-05 VITALS
BODY MASS INDEX: 42.19 KG/M2 | SYSTOLIC BLOOD PRESSURE: 129 MMHG | DIASTOLIC BLOOD PRESSURE: 81 MMHG | HEIGHT: 71 IN | HEART RATE: 87 BPM | WEIGHT: 301.38 LBS

## 2025-05-05 DIAGNOSIS — L05.91 PILONIDAL CYST: Primary | ICD-10-CM

## 2025-05-05 PROCEDURE — 3074F SYST BP LT 130 MM HG: CPT | Mod: CPTII,,, | Performed by: SURGERY

## 2025-05-05 PROCEDURE — 99024 POSTOP FOLLOW-UP VISIT: CPT | Mod: ,,, | Performed by: SURGERY

## 2025-05-05 PROCEDURE — 3079F DIAST BP 80-89 MM HG: CPT | Mod: CPTII,,, | Performed by: SURGERY

## 2025-05-05 PROCEDURE — 1159F MED LIST DOCD IN RCRD: CPT | Mod: CPTII,,, | Performed by: SURGERY

## 2025-05-05 NOTE — PROGRESS NOTES
[facility] Surgical - General Surgery Services  [address] [place] [address]  Phone: [phone]  Fax: [phone]     Post Operative Progress Note  General Surgery  Dr. Aron Mack    Patient Name: Leandro Burgess  YOB: 1985  Author: Jacki Lara ANP    Date: 05/05/2025                   SUBJECTIVE:     Chief Complaint/Reason for Admission: No chief complaint on file.       History of Present Illness:  Mr. Leandro Burgess is a 40 y.o. male who is 6 days post op surgery.  The patient is currently without any complaints.    Procedure: Pilonidal Cystectomy without closure  Date of surgery: 4/29/2025  Surgeon: Dr. Aron Mack  Pathology: benign    Patient Care Team:  Arnoldo Talley MD as PCP - General (Family Medicine)  Aron Mack MD as Consulting Physician (General Surgery)    Review of Systems:  12 point ROS negative except as stated in HPI    PAST HISTORY:     Past Medical History:   Diagnosis Date    ADHD (attention deficit hyperactivity disorder), inattentive type     GERD (gastroesophageal reflux disease)     Influenza vaccine refused     Obstructive sleep apnea     Tobacco user      Past Surgical History:   Procedure Laterality Date    INCISION AND DRAINAGE, PILONIDAL CYST, SIMPLE  03/07/2020    SURGICAL REMOVAL OF PILONIDAL CYST N/A 4/29/2025    Procedure: EXCISION, PILONIDAL CYST;  Surgeon: Aron Mack MD;  Location: [facility];  Service: General;  Laterality: N/A;     Family History   Problem Relation Name Age of Onset    No Known Problems Mother      No Known Problems Father      No Known Problems Sister      No Known Problems Brother          1/2 brother     Social History     Socioeconomic History    Marital status:     Number of children: 2   Occupational History    Occupation:    Tobacco Use    Smoking status: Former     Current packs/day: 0.00     Average packs/day: 0.5 packs/day for 20.2 years (10.1  ttl pk-yrs)     Types: Cigarettes     Start date:      Quit date: 2023     Years since quittin.0    Smokeless tobacco: Never    Tobacco comments:     Age Started: 18   Substance and Sexual Activity    Alcohol use: Yes     Comment: 1-2 drinks per day    Drug use: Never    Sexual activity: Yes     Partners: Female       MEDICATIONS & ALLERGIES:     Current Outpatient Medications on File Prior to Visit   Medication Sig    ciprofloxacin HCl (CIPRO) 500 MG tablet Take 1 tablet (500 mg total) by mouth every 12 (twelve) hours. for 7 days (Patient not taking: Reported on 2025)    dextroamphetamine-amphetamine 30 mg Tab Take 1 tablet (30 mg total) by mouth 2 (two) times a day. Fill: 3/05/2024 (Patient not taking: Reported on 2025)    HYDROcodone-acetaminophen (NORCO) 7.5-325 mg per tablet Take 1 tablet by mouth every 6 (six) hours as needed for Pain.    metroNIDAZOLE (FLAGYL) 500 MG tablet Take 1 tablet (500 mg total) by mouth every 12 (twelve) hours. for 7 days (Patient not taking: Reported on 2025)     No current facility-administered medications on file prior to visit.     Review of patient's allergies indicates:  No Known Allergies    OBJECTIVE:   There were no vitals taken for this visit.    Physical Exam:  General:  Well developed, well nourished, no acute distress  GI:  Abdomen soft, non-tender, non-distended, normoactive bowel sounds, no masses  Skin:  Wound to sacral area/gluteal cleft healing well by secondary intention. Good granulation tissue present. Incision Clean/Dry/Intact. No evidence of infection.    VISIT DIAGNOSES:     No diagnosis found.    ASSESSMENT/PLAN:     40 y.o. male 6 days post op s/p Pilonidal Cystectomy without closure.  Attempted to place wound vac, patient did not tolerate due to discomfort  -  Continue local wound care. Pack with moist to dry gauze dressing changes BID until closed. Allow wound to heal by secondary intention.  -  Patient is progressing well.  -  Wounds  healing well without evidence of infection.  -  Dr. Mack examined patient, discussed recommendations, and answered all questions regarding post op course.     WILBERTO Avitia    I, WILBERTO Putnam, am scribing for, and in the presence of, Aron Mack MD, performed the above scribed service and the documentation accurately describes the services I performed. I attest to the accuracy of the note.

## 2025-05-09 NOTE — ANESTHESIA POSTPROCEDURE EVALUATION
Anesthesia Post Evaluation    Patient: Leandro Burgess    Procedure(s) Performed: Procedure(s) (LRB):  EXCISION, PILONIDAL CYST (N/A)    Final Anesthesia Type: general      Patient location during evaluation: PACU  Patient participation: Yes- Able to Participate  Level of consciousness: awake and alert  Post-procedure vital signs: reviewed and stable  Pain management: adequate  Airway patency: patent      Anesthetic complications: no      Cardiovascular status: blood pressure returned to baseline  Respiratory status: unassisted  Hydration status: euvolemic  Follow-up not needed.              Vitals Value Taken Time   /74 04/29/25 21:42   Temp 36.7 °C (98.1 °F) 04/29/25 18:50   Pulse 79 04/29/25 21:42   Resp 20 04/29/25 20:02   SpO2 93 % 04/29/25 21:42         Event Time   Out of Recovery 18:58:00         Pain/Marguerite Score: No data recorded

## 2025-05-12 ENCOUNTER — OFFICE VISIT (OUTPATIENT)
Dept: SURGERY | Facility: CLINIC | Age: 40
End: 2025-05-12
Payer: COMMERCIAL

## 2025-05-12 VITALS
BODY MASS INDEX: 42.19 KG/M2 | SYSTOLIC BLOOD PRESSURE: 116 MMHG | HEART RATE: 76 BPM | DIASTOLIC BLOOD PRESSURE: 81 MMHG | WEIGHT: 301.38 LBS | HEIGHT: 71 IN

## 2025-05-12 DIAGNOSIS — S31.000D OPEN WOUND OF SACROILIAC REGION WITH COMPLICATION, SUBSEQUENT ENCOUNTER: ICD-10-CM

## 2025-05-12 DIAGNOSIS — L05.91 PILONIDAL CYST: ICD-10-CM

## 2025-05-12 DIAGNOSIS — Z48.89 ENCOUNTER FOR POSTOPERATIVE CARE: Primary | ICD-10-CM

## 2025-05-12 PROCEDURE — 3074F SYST BP LT 130 MM HG: CPT | Mod: CPTII,,, | Performed by: SURGERY

## 2025-05-12 PROCEDURE — 1159F MED LIST DOCD IN RCRD: CPT | Mod: CPTII,,, | Performed by: SURGERY

## 2025-05-12 PROCEDURE — 3079F DIAST BP 80-89 MM HG: CPT | Mod: CPTII,,, | Performed by: SURGERY

## 2025-05-12 PROCEDURE — 99024 POSTOP FOLLOW-UP VISIT: CPT | Mod: ,,, | Performed by: SURGERY

## 2025-05-12 NOTE — PROGRESS NOTES
[facility] Surgical - General Surgery Services  [address] [place] [address]  Phone: [phone]  Fax: [phone]     Post Operative Progress Note  General Surgery  Dr. Aron Mack    Patient Name: Leandro Burgess  YOB: 1985  Author: Jacki Lara, ANP    Date: 05/12/2025                   SUBJECTIVE:     Chief Complaint/Reason for Admission:   Chief Complaint   Patient presents with    Post-op Evaluation     4/29/25 Pilonidal - Martina          History of Present Illness:  Mr. Leandro Burgess is a 40 y.o. male who is 2 weeks post op surgery.  The patient is currently without any complaints.  Patient was unable to tolerate wound vac placement in the office last week. Returned to moist to dry gauze dressing changes. Spouse is performing dressing changes twice daily without issue.     Procedure: Pilonidal Cystectomy without closure  Date of surgery: 4/29/2025  Surgeon: Dr. Aron Mack  Pathology: benign    Patient Care Team:  Arnoldo Talley MD as PCP - General (Family Medicine)  Aron Mack MD as Consulting Physician (General Surgery)    Review of Systems:  12 point ROS negative except as stated in HPI    PAST HISTORY:     Past Medical History:   Diagnosis Date    ADHD (attention deficit hyperactivity disorder), inattentive type     GERD (gastroesophageal reflux disease)     Influenza vaccine refused     Obstructive sleep apnea     Tobacco user      Past Surgical History:   Procedure Laterality Date    INCISION AND DRAINAGE, PILONIDAL CYST, SIMPLE  03/07/2020    SURGICAL REMOVAL OF PILONIDAL CYST N/A 4/29/2025    Procedure: EXCISION, PILONIDAL CYST;  Surgeon: Aron Mack MD;  Location: [facility];  Service: General;  Laterality: N/A;     Family History   Problem Relation Name Age of Onset    No Known Problems Mother      No Known Problems Father      No Known Problems Sister      No Known Problems Brother          1/2 brother  "    Social History     Socioeconomic History    Marital status:     Number of children: 2   Occupational History    Occupation:    Tobacco Use    Smoking status: Former     Current packs/day: 0.00     Average packs/day: 0.5 packs/day for 20.2 years (10.1 ttl pk-yrs)     Types: Cigarettes     Start date:      Quit date: 2023     Years since quittin.1    Smokeless tobacco: Never    Tobacco comments:     Age Started: 18   Substance and Sexual Activity    Alcohol use: Yes     Comment: 1-2 drinks per day    Drug use: Never    Sexual activity: Yes     Partners: Female       MEDICATIONS & ALLERGIES:     Current Outpatient Medications on File Prior to Visit   Medication Sig    dextroamphetamine-amphetamine 30 mg Tab Take 1 tablet (30 mg total) by mouth 2 (two) times a day. Fill: 3/05/2024 (Patient not taking: Reported on 2025)    HYDROcodone-acetaminophen (NORCO) 7.5-325 mg per tablet Take 1 tablet by mouth every 6 (six) hours as needed for Pain.     No current facility-administered medications on file prior to visit.     Review of patient's allergies indicates:  No Known Allergies    OBJECTIVE:   Visit Vitals  /81   Pulse 76   Ht 5' 11" (1.803 m)   Wt (!) 136.7 kg (301 lb 5.9 oz)   BMI 42.03 kg/m²       Physical Exam:  General:  Well developed, well nourished, no acute distress  GI:  Abdomen soft, non-tender, non-distended, normoactive bowel sounds, no masses  Skin:  Wound to sacral area/gluteal cleft healing well by secondary intention. Good granulation tissue present. Incision Clean/Dry/Intact. No evidence of infection.    VISIT DIAGNOSES:       ICD-10-CM ICD-9-CM   1. Encounter for postoperative care  Z48.89 V58.49   2. Open wound of sacroiliac region with complication, subsequent encounter  S31.000D V58.89     877.1   3. Pilonidal cyst  L05.91 685.1       ASSESSMENT/PLAN:     40 y.o. male 2 weeks post op s/p Pilonidal Cystectomy without closure.    -  Continue local wound care. " Pack with moist to dry gauze dressing changes BID until closed. Allow wound to heal by secondary intention. Dressing change performed by Dr. Mack in office today.   -  Discontinue order for wound vac  -  Patient is progressing well.  -  Wounds healing well without evidence of infection.  -  Dr. Mack examined patient, discussed recommendations, and answered all questions regarding post op course.     RTC 4 weeks    WILBERTO Avitia    I, WILBERTO Putnam, am scribing for, and in the presence of, Aron Mack MD, performed the above scribed service and the documentation accurately describes the services I performed. I attest to the accuracy of the note.

## 2025-06-17 ENCOUNTER — OFFICE VISIT (OUTPATIENT)
Dept: SURGERY | Facility: CLINIC | Age: 40
End: 2025-06-17
Payer: COMMERCIAL

## 2025-06-17 VITALS
BODY MASS INDEX: 41.97 KG/M2 | DIASTOLIC BLOOD PRESSURE: 85 MMHG | HEART RATE: 86 BPM | SYSTOLIC BLOOD PRESSURE: 120 MMHG | HEIGHT: 71 IN | WEIGHT: 299.81 LBS

## 2025-06-17 DIAGNOSIS — S31.000D OPEN WOUND OF SACROILIAC REGION WITH COMPLICATION, SUBSEQUENT ENCOUNTER: Primary | ICD-10-CM

## 2025-06-17 DIAGNOSIS — E66.01 MORBID OBESITY: ICD-10-CM

## 2025-06-17 PROCEDURE — 3074F SYST BP LT 130 MM HG: CPT | Mod: CPTII,,, | Performed by: SURGERY

## 2025-06-17 PROCEDURE — 1159F MED LIST DOCD IN RCRD: CPT | Mod: CPTII,,, | Performed by: SURGERY

## 2025-06-17 PROCEDURE — 99213 OFFICE O/P EST LOW 20 MIN: CPT | Mod: ,,, | Performed by: SURGERY

## 2025-06-17 PROCEDURE — 3079F DIAST BP 80-89 MM HG: CPT | Mod: CPTII,,, | Performed by: SURGERY

## 2025-06-17 PROCEDURE — 3008F BODY MASS INDEX DOCD: CPT | Mod: CPTII,,, | Performed by: SURGERY

## 2025-06-28 NOTE — PROGRESS NOTES
[facility] Surgical - General Surgery Services  [address] [place] [address]  Phone: [phone]  Fax: [phone]     Progress Note  General Surgery  Dr. Aron Mack      Patient Name: Leandro Burgess  YOB: 1985  Author: Aron Mack MD     Date: [id number]                  SUBJECTIVE:     Chief Complaint/Reason for Admission:   Chief Complaint   Patient presents with    Follow-up     4/29/25 Pilonidal - Lukaszssin-        History of Present Illness:  Mr. Leandro Burgess is a 40 y.o. male that presents to clinic for surgical evaluation and fu of excision of chronically infected pilonidal cyst w abscess.  His wife has been performing saline moist to dry.    No complaints.     Referring provider: No ref. provider found   PCP: Arnoldo Talley MD     Review of Systems:  12 point ROS negative except as stated in HPI    PAST HISTORY:     Past Medical History:   Diagnosis Date    ADHD (attention deficit hyperactivity disorder), inattentive type     GERD (gastroesophageal reflux disease)     Influenza vaccine refused     Obstructive sleep apnea     Tobacco user      Past Surgical History:   Procedure Laterality Date    INCISION AND DRAINAGE, PILONIDAL CYST, SIMPLE  03/07/2020    SURGICAL REMOVAL OF PILONIDAL CYST N/A 4/29/2025    Procedure: EXCISION, PILONIDAL CYST;  Surgeon: Aron Mack MD;  Location: [facility];  Service: General;  Laterality: N/A;     Family History   Problem Relation Name Age of Onset    No Known Problems Mother      No Known Problems Father      No Known Problems Sister      No Known Problems Brother          1/2 brother     Social History     Socioeconomic History    Marital status:     Number of children: 2   Occupational History    Occupation:    Tobacco Use    Smoking status: Former     Current packs/day: 0.00     Average packs/day: 0.5 packs/day for 20.2 years (10.1 ttl pk-yrs)     Types: Cigarettes      "Start date:      Quit date: 2023     Years since quittin.2    Smokeless tobacco: Never    Tobacco comments:     Age Started: 18   Substance and Sexual Activity    Alcohol use: Yes     Comment: 1-2 drinks per day    Drug use: Never    Sexual activity: Yes     Partners: Female       MEDICATIONS & ALLERGIES:     Current Outpatient Medications on File Prior to Visit   Medication Sig    dextroamphetamine-amphetamine 30 mg Tab Take 1 tablet (30 mg total) by mouth 2 (two) times a day. Fill: 3/05/2024 (Patient not taking: Reported on 2025)    HYDROcodone-acetaminophen (NORCO) 7.5-325 mg per tablet Take 1 tablet by mouth every 6 (six) hours as needed for Pain.     No current facility-administered medications on file prior to visit.     Review of patient's allergies indicates:  No Known Allergies    OBJECTIVE:     Vitals:    25 1519   BP: 120/85   Pulse: 86   Weight: 136 kg (299 lb 13.2 oz)   Height: 5' 11" (1.803 m)     Body mass index is 41.82 kg/m².    Physical Exam:  General:  Well developed, well nourished, no acute distress  HEENT:  Normocephalic, atraumatic, PERRL, EOMI, clear sclera, ears normal, neck supple, throat clear without erythema or exudates  CVS:  RRR, S1 and S2 normal, no murmurs, rubs, gallops  Resp:  Lungs clear to auscultation, no wheezes, rales, rhonchi, cough  GI:  Abdomen soft, non-tender, non-distended, normoactive bowel sounds, no masses  :  Deferred  MSK:  No muscle atrophy, cyanosis, peripheral edema, full range of motion  Skin:  No rashes, ulcers, erythema  Sacral wound healing well with excellent contraction and granulation  Neuro:  CNII-XII grossly intact  Psych:  Alert and oriented to person, place, and time    Results:  I have independently reviewed all pertinent lab and radiologic studies relevant to general surgery.      VISIT DIAGNOSES:       ICD-10-CM ICD-9-CM   1. Open wound of sacroiliac region with complication, subsequent encounter  S31.000D V58.89     877.1 "   2. Morbid obesity  E66.01 278.01       ASSESSMENT/PLAN:       Plan:  cont wound care  Dr. Mack examined patient, discussed recommendations, obtained informed consent, and answered all questions with patient/family.     Aron Mcak MD      RTC 6 weeks

## 2025-07-29 ENCOUNTER — OFFICE VISIT (OUTPATIENT)
Dept: SURGERY | Facility: CLINIC | Age: 40
End: 2025-07-29
Payer: COMMERCIAL

## 2025-07-29 VITALS
HEIGHT: 71 IN | HEART RATE: 84 BPM | BODY MASS INDEX: 41.97 KG/M2 | SYSTOLIC BLOOD PRESSURE: 130 MMHG | WEIGHT: 299.81 LBS | DIASTOLIC BLOOD PRESSURE: 89 MMHG

## 2025-07-29 DIAGNOSIS — Z48.89 ENCOUNTER FOR POSTOPERATIVE CARE: Primary | ICD-10-CM

## 2025-07-29 DIAGNOSIS — S31.000D WOUND OF SACRAL REGION, SUBSEQUENT ENCOUNTER: ICD-10-CM

## 2025-07-29 PROCEDURE — 1159F MED LIST DOCD IN RCRD: CPT | Mod: CPTII,,, | Performed by: NURSE PRACTITIONER

## 2025-07-29 PROCEDURE — 3079F DIAST BP 80-89 MM HG: CPT | Mod: CPTII,,, | Performed by: NURSE PRACTITIONER

## 2025-07-29 PROCEDURE — 3075F SYST BP GE 130 - 139MM HG: CPT | Mod: CPTII,,, | Performed by: NURSE PRACTITIONER

## 2025-07-29 PROCEDURE — 3008F BODY MASS INDEX DOCD: CPT | Mod: CPTII,,, | Performed by: NURSE PRACTITIONER

## 2025-07-29 PROCEDURE — 99213 OFFICE O/P EST LOW 20 MIN: CPT | Mod: ,,, | Performed by: NURSE PRACTITIONER

## 2025-07-29 NOTE — PROGRESS NOTES
[facility] Surgical - General Surgery Services  [address] [place] [address]  Phone: [phone]  Fax: [phone]     Post Operative Progress Note  General Surgery  Dr. Aron Mack    Patient Name: Leandro Burgess  YOB: 1985  Author: Jacki Lara, ANP    Date: 07/29/2025                   SUBJECTIVE:     Chief Complaint/Reason for Admission:   Chief Complaint   Patient presents with    Post-op Evaluation     pilonidal cyst 04/29/2025, wound check        History of Present Illness:  Mr. Leandro Burgess is a 40 y.o. male who is 3 months post op. Presents for monthly wound check. Pilonidal cystectomy allowed to close by secondary intention. Spouse packing wound once daily.  The patient is currently without any complaints.    Procedure: Pilonidal cystectomy  Date of surgery: 4/29/25  Surgeon: Dr. Aron Mack      Patient Care Team:  Arnoldo Talley MD as PCP - General (Family Medicine)  Aron Mack MD as Consulting Physician (General Surgery)    Review of Systems:  12 point ROS negative except as stated in HPI    PAST HISTORY:     Past Medical History:   Diagnosis Date    ADHD (attention deficit hyperactivity disorder), inattentive type     GERD (gastroesophageal reflux disease)     Influenza vaccine refused     Obstructive sleep apnea     Tobacco user      Past Surgical History:   Procedure Laterality Date    INCISION AND DRAINAGE, PILONIDAL CYST, SIMPLE  03/07/2020    SURGICAL REMOVAL OF PILONIDAL CYST N/A 4/29/2025    Procedure: EXCISION, PILONIDAL CYST;  Surgeon: Aron Mack MD;  Location: [facility];  Service: General;  Laterality: N/A;     Family History   Problem Relation Name Age of Onset    No Known Problems Mother      No Known Problems Father      No Known Problems Sister      No Known Problems Brother          1/2 brother     Social History     Socioeconomic History    Marital status:     Number of children: 2  "  Occupational History    Occupation:    Tobacco Use    Smoking status: Former     Current packs/day: 0.00     Average packs/day: 0.5 packs/day for 20.2 years (10.1 ttl pk-yrs)     Types: Cigarettes     Start date:      Quit date: 2023     Years since quittin.3    Smokeless tobacco: Never    Tobacco comments:     Age Started: 18   Substance and Sexual Activity    Alcohol use: Yes     Comment: 1-2 drinks per day    Drug use: Never    Sexual activity: Yes     Partners: Female       MEDICATIONS & ALLERGIES:     Current Outpatient Medications on File Prior to Visit   Medication Sig    dextroamphetamine-amphetamine 30 mg Tab Take 1 tablet (30 mg total) by mouth 2 (two) times a day. Fill: 3/05/2024 (Patient not taking: Reported on 2025)    HYDROcodone-acetaminophen (NORCO) 7.5-325 mg per tablet Take 1 tablet by mouth every 6 (six) hours as needed for Pain.     No current facility-administered medications on file prior to visit.     Review of patient's allergies indicates:  No Known Allergies    OBJECTIVE:   Visit Vitals  /89   Pulse 84   Ht 5' 11" (1.803 m)   Wt 136 kg (299 lb 13.2 oz)   BMI 41.82 kg/m²       Physical Exam:  General:  Well developed, well nourished, no acute distress    Skin:  Incision Clean/Dry/Intact. No evidence of infection. Sacral wound healing well by secondary intention. Dry gauze dressing removed. Good granulation tissue present, mild amount of yellow eschar to base of wound. No tunneling. Silver nitrate applied to base of wound.   Wound measurement 3 cm x 2 cm x 1 cm.    VISIT DIAGNOSES:       ICD-10-CM ICD-9-CM   1. Encounter for postoperative care  Z48.89 V58.49   2. Wound of sacral region, subsequent encounter  S31.000D V58.89     879.6       ASSESSMENT/PLAN:     40 y.o. male 3 months post op s/p pilonidal cystectomy     -  Patient is progressing well.  -  Wounds healing well without evidence of infection.  -  Silver nitrate applied to base of wound to " encourage wound healing.   - Continue daily dry gauze dressing changes until completely closed.   -  Examined patient, discussed recommendations, and answered all questions regarding post op course.     RTC 4 weeks for wound recheck.    Jacki Lara, ANP

## 2025-08-26 ENCOUNTER — OFFICE VISIT (OUTPATIENT)
Dept: SURGERY | Facility: CLINIC | Age: 40
End: 2025-08-26
Payer: COMMERCIAL

## (undated) DEVICE — GLOVE SURG BIOGEL LATEX SZ 7.5

## (undated) DEVICE — SPONGE COTTON TRAY 4X4IN

## (undated) DEVICE — GOWN POLY REINF BRTH SLV XL

## (undated) DEVICE — Device

## (undated) DEVICE — ADHESIVE MASTISOL VIAL 48/BX

## (undated) DEVICE — HEMOSTAT SURGICEL FIBRLR 2X4IN

## (undated) DEVICE — TRAY SKIN SCRUB WET PREMIUM

## (undated) DEVICE — NDL SYR 10ML 18X1.5 LL BLUNT

## (undated) DEVICE — ELECTRODE PATIENT RETURN DISP

## (undated) DEVICE — NDL SAFETY 21G X 1 1/2 ECLPSE

## (undated) DEVICE — BLADE CLIPPER SURGICAL GRAY

## (undated) DEVICE — SUT ETHILON 3-0 FS-1 30

## (undated) DEVICE — PAD ABDOMINAL STERILE 5X9IN

## (undated) DEVICE — PENCIL ELECSURG ROCKER 15FT

## (undated) DEVICE — SOL NACL IRR 1000ML BTL

## (undated) DEVICE — BANDAGE GAUZE COT STRL 4.5X4.1

## (undated) DEVICE — SUPPORT ULNA NERVE PROTECTOR

## (undated) DEVICE — SUT VICRYL 3-0 OB 36 CT-1

## (undated) DEVICE — GLOVE SIGNATURE MICRO LTX 6

## (undated) DEVICE — HEADREST DERMAPROX PED 7IN